# Patient Record
Sex: FEMALE | Race: WHITE | NOT HISPANIC OR LATINO
[De-identification: names, ages, dates, MRNs, and addresses within clinical notes are randomized per-mention and may not be internally consistent; named-entity substitution may affect disease eponyms.]

---

## 2024-01-30 ENCOUNTER — NON-APPOINTMENT (OUTPATIENT)
Age: 59
End: 2024-01-30

## 2024-02-21 PROBLEM — Z00.00 ENCOUNTER FOR PREVENTIVE HEALTH EXAMINATION: Status: ACTIVE | Noted: 2024-02-21

## 2024-02-22 ENCOUNTER — APPOINTMENT (OUTPATIENT)
Dept: NEUROLOGY | Facility: CLINIC | Age: 59
End: 2024-02-22
Payer: MEDICAID

## 2024-02-22 VITALS
HEART RATE: 84 BPM | SYSTOLIC BLOOD PRESSURE: 99 MMHG | BODY MASS INDEX: 18.1 KG/M2 | TEMPERATURE: 97.5 F | WEIGHT: 106 LBS | DIASTOLIC BLOOD PRESSURE: 63 MMHG | HEIGHT: 64 IN | OXYGEN SATURATION: 96 %

## 2024-02-22 DIAGNOSIS — Z78.9 OTHER SPECIFIED HEALTH STATUS: ICD-10-CM

## 2024-02-22 DIAGNOSIS — Z87.39 PERSONAL HISTORY OF OTHER DISEASES OF THE MUSCULOSKELETAL SYSTEM AND CONNECTIVE TISSUE: ICD-10-CM

## 2024-02-22 DIAGNOSIS — G43.E09 CHRONIC MIGRAINE WITH AURA, NOT INTRACTABLE, WITHOUT STATUS MIGRAINOSUS: ICD-10-CM

## 2024-02-22 PROCEDURE — G2211 COMPLEX E/M VISIT ADD ON: CPT | Mod: NC,1L

## 2024-02-22 PROCEDURE — 99204 OFFICE O/P NEW MOD 45 MIN: CPT

## 2024-02-22 RX ORDER — METOCLOPRAMIDE 10 MG/1
10 TABLET ORAL 3 TIMES DAILY
Qty: 12 | Refills: 1 | Status: ACTIVE | COMMUNITY
Start: 2024-02-22 | End: 1900-01-01

## 2024-02-22 RX ORDER — SUMATRIPTAN 100 MG/1
100 TABLET, FILM COATED ORAL
Refills: 0 | Status: ACTIVE | COMMUNITY

## 2024-02-22 RX ORDER — TOPIRAMATE 25 MG/1
25 TABLET, FILM COATED ORAL
Qty: 90 | Refills: 1 | Status: ACTIVE | COMMUNITY
Start: 2024-02-22 | End: 1900-01-01

## 2024-02-22 RX ORDER — DENOSUMAB 60 MG/ML
60 INJECTION SUBCUTANEOUS
Refills: 0 | Status: ACTIVE | COMMUNITY

## 2024-02-22 RX ORDER — MAGNESIUM 200 MG
200 TABLET ORAL DAILY
Refills: 0 | Status: ACTIVE | COMMUNITY

## 2024-02-22 NOTE — PHYSICAL EXAM
[FreeTextEntry1] : General: Constitutional:  Sitting comfortably in NAD. Psychiatric: well-groomed, appropriate affect, insight/judgment intact Ears, Nose, Throat: no abnormalities, mucus membranes moist Mallampati:  Neck: supple, no lymphadenopathy or nodules palpable Neck Circumference: Cardiovascular: regular rate and rhythm, normal S1/S2, no murmurs  Chest: Clear to bases. 	Abdomen: soft, non-tender Extremities: no edema, clubbing or cyanosis Skin:  no rash or neurocutaneous signs   Cognitive: Orientation, language, memory and knowledge screens intact. Cranial Nerves: II: Full to confrontation; disc margins sharp. III/IV/VI: PERRL EOMF No nystagmus V1V2V3: Symmetric, VII: Face appears symmetric VIII: Normal to screening, IX/X: Palate Elevates Symmetrical  XI: Trapezius Symmetric  XII: Tongue midline  Motor: Power: 5/5 throughout Tone: normal x 4 limbs,  Tremor: none  Sensation:  Intact to light touch.   Coordination/Gait: Finger-nose-finger intact, normal rapid-alternating movements. Fine motor normal with normal rapid finger taps and heel tapping  Narrow based gait, tandem forward ok Hops well on both feet Heel and toe walking normal  Romberg negative  Reflexes: DTR: 1-2+ symmetric x 4 limbs

## 2024-02-22 NOTE — HISTORY OF PRESENT ILLNESS
[FreeTextEntry1] :  Sonia is a 57 yo woman sent for consultation for headaches with vomiting. Freelancing - .  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 Location:  glabellar region, but can be holocephalic myalgia Character:   dull ache Severity: intensified from sometimes nothing to 4/10.  True migraines were 10/10 in the past. Duration:  hours until takes migraine med (sumatriptan 50mg PRN) Hypersensitivity:  nausea and quickly into vomiting, PP but can also progress to S/P, osmo. H/A days/month:  30/30 Aura:  used to lose peripheral vision with blurring, tunnel vision, but not currently occuring. Autonomic symptoms:  nose will run/stuffy, no tearing or conjunctival injection. Alleviated by:  excedrin migraine, rizatriptan, sumatriptan Aggravations/Triggers:  caffeine Sleep/menses/:  pattern with sleep. Other medication/supplement trials:  caffeine Fam Hx of H/A:  no Imaging:  none

## 2024-02-22 NOTE — CONSULT LETTER
[Dear  ___] : Dear  [unfilled], [Consult Letter:] : I had the pleasure of evaluating your patient, [unfilled]. [Please see my note below.] : Please see my note below. [Consult Closing:] : Thank you very much for allowing me to participate in the care of this patient.  If you have any questions, please do not hesitate to contact me. [Sincerely,] : Sincerely, [FreeTextEntry3] : Petar Roman MD MSc Vice-Chair, Neurology

## 2024-02-22 NOTE — DISCUSSION/SUMMARY
[FreeTextEntry1] : Impression: 1) headache recurrence, late onset without clear trigger and daily with significant vomiting each time.  Sounds migrainous though, and the headache component dissipates with sumatriptan, though feels very sleepy.  Already taking magnesium, but not sure. 2) pre-syncopal-like symptoms, tingling in limbs, then sweating and flushing, not happened in years. 3) osteoporosis, on treatment, but will want to avoid meds that could contribute 4) history of ulcer, treated, possibly mild gastritis  Plan: 1) MRI of brain 2) trial of eletriptan for less adverse effects 3) metoclopramide as alternative 4) magensium  5) picked topiramate low dose as preventative.

## 2024-03-03 ENCOUNTER — APPOINTMENT (OUTPATIENT)
Dept: MRI IMAGING | Facility: HOSPITAL | Age: 59
End: 2024-03-03

## 2024-04-22 ENCOUNTER — TRANSCRIPTION ENCOUNTER (OUTPATIENT)
Age: 59
End: 2024-04-22

## 2024-06-21 ENCOUNTER — TRANSCRIPTION ENCOUNTER (OUTPATIENT)
Age: 59
End: 2024-06-21

## 2024-06-21 RX ORDER — ELETRIPTAN HYDROBROMIDE 40 MG/1
40 TABLET, FILM COATED ORAL
Qty: 8 | Refills: 1 | Status: ACTIVE | COMMUNITY
Start: 2024-02-22 | End: 1900-01-01

## 2024-09-18 ENCOUNTER — TRANSCRIPTION ENCOUNTER (OUTPATIENT)
Age: 59
End: 2024-09-18

## 2024-10-07 ENCOUNTER — TRANSCRIPTION ENCOUNTER (OUTPATIENT)
Age: 59
End: 2024-10-07

## 2025-04-08 ENCOUNTER — RX RENEWAL (OUTPATIENT)
Age: 60
End: 2025-04-08

## 2025-05-29 ENCOUNTER — TRANSCRIPTION ENCOUNTER (OUTPATIENT)
Age: 60
End: 2025-05-29

## 2025-06-02 ENCOUNTER — NON-APPOINTMENT (OUTPATIENT)
Age: 60
End: 2025-06-02

## 2025-06-02 ENCOUNTER — APPOINTMENT (OUTPATIENT)
Dept: NEUROLOGY | Facility: CLINIC | Age: 60
End: 2025-06-02
Payer: MEDICAID

## 2025-06-02 DIAGNOSIS — G43.E09 CHRONIC MIGRAINE WITH AURA, NOT INTRACTABLE, WITHOUT STATUS MIGRAINOSUS: ICD-10-CM

## 2025-06-02 PROCEDURE — 99214 OFFICE O/P EST MOD 30 MIN: CPT | Mod: 95

## 2025-06-04 ENCOUNTER — APPOINTMENT (OUTPATIENT)
Dept: NEUROLOGY | Facility: CLINIC | Age: 60
End: 2025-06-04

## 2025-06-06 ENCOUNTER — TRANSCRIPTION ENCOUNTER (OUTPATIENT)
Age: 60
End: 2025-06-06

## 2025-08-18 ENCOUNTER — TRANSCRIPTION ENCOUNTER (OUTPATIENT)
Age: 60
End: 2025-08-18

## 2025-09-15 ENCOUNTER — RX RENEWAL (OUTPATIENT)
Age: 60
End: 2025-09-15